# Patient Record
Sex: FEMALE | Race: WHITE
[De-identification: names, ages, dates, MRNs, and addresses within clinical notes are randomized per-mention and may not be internally consistent; named-entity substitution may affect disease eponyms.]

---

## 2017-01-03 ENCOUNTER — HOSPITAL ENCOUNTER (EMERGENCY)
Dept: HOSPITAL 80 - CED | Age: 42
Discharge: HOME | End: 2017-01-03
Payer: COMMERCIAL

## 2017-01-03 VITALS
HEART RATE: 64 BPM | SYSTOLIC BLOOD PRESSURE: 143 MMHG | RESPIRATION RATE: 16 BRPM | DIASTOLIC BLOOD PRESSURE: 99 MMHG | OXYGEN SATURATION: 92 % | TEMPERATURE: 98.6 F

## 2017-01-03 DIAGNOSIS — S93.411A: Primary | ICD-10-CM
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
## 2017-01-03 PROCEDURE — L4386 NON-PNEUM WALK BOOT PRE CST: HCPCS

## 2017-01-03 NOTE — DX
Right ankle, 3 views 

 

History: Trauma, pain.

 

Findings: Lateral soft tissue swelling. Tiny nondisplaced fracture from the distal tip of the fibula.
 No widening of the ankle mortise. The malleolus appears intact. Talar dome appears intact.

 

Impression: Lateral soft tissue swelling with acute fracture of the distal tip of the fibula.

 

 

Findings and recommendations discussed with Emergency Department physician, CLARK Coy, at 15
50 hours today.

 

Final report concurs with initial preliminary interpretation.

## 2017-01-03 NOTE — UCPHY
[id number]   4d


01/03/17 11:04


HPI/ROS: 


CHIEF COMPLAINT:  Right ankle pain





HISTORY OF PRESENT ILLNESS:  41-year-old female presents complaining of right 

ankle pain.  Patient missed a step yesterday walking down her stairs and 

twisted her ankle.  She complains of lateral ankle pain. She denies other 

injuries, no head strike.  Patient reports previous severe ankle sprain 3 years 

for this same ankle.  She has been unable to bear weight since the accident. (

Eve Hillman)





Physical Exam: 


General appearance: alert no distress


Right ankle:  There is swelling and tenderness over the lateral ankle.  No 

tenderness over medial or lateral malleolus. TTP to ATFL and CFL. There is no 

tenderness over the achilles tendon.


The foot is non-tender without swelling. No TTP over 5th metatarsal. 


Neurologic exam: The patient has normal sensation and motor function distal to 

the injury.


Vascular exam: Normal pulses and capillary refill in the foot (Eve Hillman)





Constitutional: 





 Initial Vital Signs











Temperature (C)  37 C   01/03/17 11:02


 


Heart Rate  64   01/03/17 11:02


 


Respiratory Rate  16   01/03/17 11:02


 


Blood Pressure  143/99 H  01/03/17 11:02


 


O2 Sat (%)  92   01/03/17 11:02








 











O2 Delivery Mode               Room Air

















Allergies/Adverse Reactions: 


 





BENADRYL TOPICAL Allergy (Uncoded 01/03/17 11:01)


 








Home Medications: 














 Medication  Instructions  Recorded


 


Ambien  03/21/16


 


Zoloft 100mg (RX)  03/21/16


 


Allegra Allergy  01/03/17


 


Losartan Potassium  01/03/17


 


Olanzapine  01/03/17


 


Oxycodone HCl [Roxicodone] 5 mg PO Q6 PRN #15 tablet 01/03/17














MDM/Departure





- MDM


Diagnostics: 


Right ankle x-ray independently reviewed by me-


Lateral soft tissue swelling, no fracture (Eve Hillman)





Medications Given: 





 








Discontinued Medications





Oxycodone HCl (Oxycodone Ir)  5 mg PO EDNOW ONE


   Stop: 01/03/17 11:43


   Last Admin: 01/03/17 12:01 Dose:  5 mg








ED Course/Re-evaluation: 


Urgent Care PA supervision





Physician documentation:





The patient was evaluated and managed by the physician assistant.  My co-

signature indicates that I have reviewed this chart and I agree with the 

findings and plan of care as documented.  I am is secondary supervising 

physician. (Janes Soliman)








- Depart


Disposition: Home, Routine, Self-Care


Clinical Impression: 


Ankle sprain


Qualifiers:


 Encounter type: initial encounter Involved ligament of ankle: calcaneofibular 

ligament Laterality: right Qualifier Code: (S93.411A) Sprain of calcaneofibular 

ligament of right ankle, initial encounter


Condition: Fair


Instructions:  Oxycodone, Rapid Release (By mouth), Ankle Sprain (ED)


Additional Instructions: 


Rest, ice, elevate, take 600mg of ibuprofen every 8 hours with food for 3-5 

days as needed for pain and swelling. Take 1 oxycodone every 6 hours as needed 

for severe pain.  Follow up with orthopedist at 1st available appointment.  Use 

crutches in do not bear weight until you follow up.  Return to the emergency 

department for any numbness, tingling, discoloration of you limb or other 

concerns.


Stand Alone Forms:  Work Excuse


Prescriptions: 


Oxycodone HCl [Roxicodone] 5 mg PO Q6 PRN #15 tablet


 PRN Reason: Pain, Breakthrough


Referrals: 


Catarino Luna MD [Medical Doctor] - As per Instructions (Orthopedist on-call)





- PQRS


PQRS Measurement: 


na (Eve Hillman)

## 2018-10-16 ENCOUNTER — HOSPITAL ENCOUNTER (OUTPATIENT)
Dept: HOSPITAL 80 - BMCIMAGING | Age: 43
End: 2018-10-16
Attending: NURSE PRACTITIONER
Payer: COMMERCIAL

## 2018-10-16 DIAGNOSIS — F17.210: ICD-10-CM

## 2018-10-16 DIAGNOSIS — J40: Primary | ICD-10-CM
